# Patient Record
Sex: FEMALE | Race: WHITE
[De-identification: names, ages, dates, MRNs, and addresses within clinical notes are randomized per-mention and may not be internally consistent; named-entity substitution may affect disease eponyms.]

---

## 2018-03-30 ENCOUNTER — HOSPITAL ENCOUNTER (OUTPATIENT)
Dept: HOSPITAL 58 - RAD | Age: 33
Discharge: HOME | End: 2018-03-30
Attending: NURSE PRACTITIONER

## 2018-03-30 DIAGNOSIS — R19.7: ICD-10-CM

## 2018-03-30 DIAGNOSIS — R10.9: Primary | ICD-10-CM

## 2018-03-30 DIAGNOSIS — R11.2: ICD-10-CM

## 2018-03-30 DIAGNOSIS — R10.815: ICD-10-CM

## 2018-03-30 PROCEDURE — 85025 COMPLETE CBC W/AUTO DIFF WBC: CPT

## 2018-03-30 PROCEDURE — 80053 COMPREHEN METABOLIC PANEL: CPT

## 2018-03-30 PROCEDURE — 36415 COLL VENOUS BLD VENIPUNCTURE: CPT

## 2018-03-30 NOTE — CT
EXAM:  CT Abdomen with contrast.  CT Pelvis with contrast. 

  

HISTORY:  Mid abdominal pain and nausea. 

  

COMPARISON:  None available. 

  

TECHNIQUE:  Multiple axial images of the abdomen and pelvis were obtained following intravenous admin
istration of 75 mL of Omnipaque 350, low osmolar.  Images were reformatted in the sagittal and corona
l plane. 

  

FINDINGS:  Lung bases are clear.  No acute osseous abnormality detected. 

  

The liver, gallbladder, pancreas, spleen, adrenal glands, and kidneys without acute abnormality.  Lynette
pect nonobstructing right renal calculi in the lower pole collecting system measuring up to 0.4 cm. 

  

The appendix is dilated to 0.9 cm and does not contain internal air.  There is some submucosal fat de
position in the appendix.  No significant adjacent inflammation identified.  The bowel is otherwise u
nremarkable.  A few diverticula are present in the colon, including the right colon.  Uterus demonstr
ates normal contour.  Urinary bladder is unremarkable.  No free fluid, free air or lymphadenopathy id
entified.  Small fat-containing umbilical hernia noted. 

  

--------------------------- 

IMPRESSION: 

1.  Mildly enlarged appendix without adjacent inflammation.  Early/mild acute appendicitis could be p
resent in the proper clinical setting although in the absence of appropriate clinical symptoms, this 
finding could be due to previous / chronic inflammation.  Consider short-term follow-up if symptoms p
ersist. 

2.  Right nephrolithiasis. 

3.  Mild diverticulosis. 

  

Comment:  Findings were discussed with Mary Jane Pedroza at 3:55 p.m. on 03/30/2018.

## 2018-05-08 ENCOUNTER — OFFICE VISIT (OUTPATIENT)
Dept: FAMILY MEDICINE CLINIC | Facility: CLINIC | Age: 33
End: 2018-05-08

## 2018-05-08 ENCOUNTER — TELEPHONE (OUTPATIENT)
Dept: FAMILY MEDICINE CLINIC | Facility: CLINIC | Age: 33
End: 2018-05-08

## 2018-05-08 VITALS
SYSTOLIC BLOOD PRESSURE: 122 MMHG | HEART RATE: 73 BPM | DIASTOLIC BLOOD PRESSURE: 64 MMHG | TEMPERATURE: 98.7 F | OXYGEN SATURATION: 97 % | RESPIRATION RATE: 16 BRPM | WEIGHT: 133 LBS

## 2018-05-08 DIAGNOSIS — R10.84 GENERALIZED ABDOMINAL PAIN: Primary | ICD-10-CM

## 2018-05-08 PROCEDURE — 99213 OFFICE O/P EST LOW 20 MIN: CPT | Performed by: FAMILY MEDICINE

## 2018-05-08 NOTE — PROGRESS NOTES
Subjective   Dian Benson is a 32 y.o. female.     Chief Complaint   Patient presents with   • Abdominal Pain        History of Present Illness     she has had issues with current abdominal pain--related to eating--she has nausea--was told her appendix was enalrged    No current outpatient prescriptions on file.  No Known Allergies    No past medical history on file.  No past surgical history on file.    Review of Systems   Constitutional: Negative.    HENT: Negative.    Eyes: Negative.    Respiratory: Negative.    Cardiovascular: Negative.    Gastrointestinal: Positive for abdominal pain and nausea.   Endocrine: Negative.    Genitourinary: Negative.    Musculoskeletal: Negative.    Skin: Negative.    Allergic/Immunologic: Negative.    Neurological: Negative.    Hematological: Negative.    Psychiatric/Behavioral: Negative.        Objective  /64   Pulse 73   Temp 98.7 °F (37.1 °C)   Resp 16   Wt 60.3 kg (133 lb)   SpO2 97%   Physical Exam   Constitutional: She is oriented to person, place, and time. She appears well-developed and well-nourished.   HENT:   Head: Normocephalic and atraumatic.   Right Ear: External ear normal.   Left Ear: External ear normal.   Nose: Nose normal.   Mouth/Throat: Oropharynx is clear and moist.   Eyes: Conjunctivae and EOM are normal. Pupils are equal, round, and reactive to light.   Neck: Normal range of motion. Neck supple.   Cardiovascular: Normal rate, regular rhythm, normal heart sounds and intact distal pulses.    Pulmonary/Chest: Effort normal and breath sounds normal.   Abdominal: Soft. Bowel sounds are normal.   Musculoskeletal: Normal range of motion.   Neurological: She is alert and oriented to person, place, and time. She has normal reflexes.   Skin: Skin is warm. Capillary refill takes less than 2 seconds.   Psychiatric: She has a normal mood and affect. Her behavior is normal. Judgment and thought content normal.   Nursing note and vitals  reviewed.      Assessment/Plan   Dian was seen today for abdominal pain.    Diagnoses and all orders for this visit:    Generalized abdominal pain  -     Urinalysis - Urine, Clean Catch  -     Pregnancy, Urine - Urine, Clean Catch  -     CT Abdomen Pelvis With Contrast; Future  -     US Gallbladder; Future  -     NM HIDA scan with pharmacological intervention; Future                 Orders Placed This Encounter   Procedures   • CT Abdomen Pelvis With Contrast     Standing Status:   Future     Standing Expiration Date:   5/8/2019     Order Specific Question:   Will Oral Contrast be needed for this procedure?     Answer:   Yes     Order Specific Question:   Patient Pregnant     Answer:   No   • US Gallbladder     Standing Status:   Future     Standing Expiration Date:   5/8/2019     Order Specific Question:   Reason for Exam:     Answer:   post prandial pain   • NM HIDA scan with pharmacological intervention     Standing Status:   Future     Standing Expiration Date:   5/8/2019     Order Specific Question:   Patient Pregnant     Answer:   No   • Urinalysis - Urine, Clean Catch   • Pregnancy, Urine - Urine, Clean Catch       Follow up: 4 week(s)

## 2018-05-08 NOTE — TELEPHONE ENCOUNTER
Pt tonja she siad that last month she started having stomach pains she went to [facility] they did ct scan it showed inlarged apendix they gave her anbtx and pt felt better now since yesterday she has started having stomach pains again and she is asking to see you before 2pm today [phone]

## 2018-05-09 LAB
APPEARANCE UR: CLEAR
BILIRUB UR QL STRIP: NEGATIVE
COLOR UR: YELLOW
GLUCOSE UR QL: NEGATIVE
HCG UR QL: NEGATIVE
HGB UR QL STRIP: NEGATIVE
KETONES UR QL STRIP: NEGATIVE
LEUKOCYTE ESTERASE UR QL STRIP: NEGATIVE
NITRITE UR QL STRIP: NEGATIVE
PH UR STRIP: 8 [PH] (ref 5–8)
PROT UR QL STRIP: NEGATIVE
SP GR UR: 1.01
UROBILINOGEN UR STRIP-MCNC: NORMAL MG/DL

## 2018-05-10 ENCOUNTER — HOSPITAL ENCOUNTER (OUTPATIENT)
Dept: HOSPITAL 58 - RAD | Age: 33
Discharge: HOME | End: 2018-05-10
Attending: FAMILY MEDICINE

## 2018-05-10 DIAGNOSIS — R10.84: Primary | ICD-10-CM

## 2018-05-10 DIAGNOSIS — R10.84 GENERALIZED ABDOMINAL PAIN: ICD-10-CM

## 2018-05-10 PROCEDURE — 82565 ASSAY OF CREATININE: CPT

## 2018-05-10 PROCEDURE — 36415 COLL VENOUS BLD VENIPUNCTURE: CPT

## 2018-05-10 NOTE — US
EXAM:  ULTRASOUND ABDOMEN LIMITED 

  

HISTORY:  Abdominal pain 

  

FINDINGS:  Ultrasound abdomen, limited.  Gray-scale ultrasound and color Doppler was performed.  Live
r size was normal at about 12 cm. The liver parenchyma demonstrated normal sonographic appearance wit
hout evidence of intrahepatic biliary dilatation or focal lesion. Patent and hepatopedal main portal 
vein. 

  

No evidence of gallbladder stones or sludge.  Gallbladder wall thickness was normal at 0.16 centimete
rs and the common duct diameter normal at 0.25 centimeters. 

  

The visualized portions of the pancreas appeared unremarkable. 

  

IMPRESSION:    Findings within normal limits.

## 2018-05-10 NOTE — CT
EXAM:  CT Abdomen with contrast.  CT Pelvis with contrast. 

  

HISTORY:  Mid abdominal pain, nausea. 

  

COMPARISON:  03/30/2018. 

  

TECHNIQUE:  Multiple axial images of the abdomen and pelvis were obtained following intravenous admin
istration of 75 mL of Omnipaque 350, low osmolar.  Images were reformatted in the sagittal and corona
l plane. 

  

FINDINGS:  The lung bases are clear.  No acute osseous abnormality identified. 

  

The liver, gallbladder, pancreas, spleen, adrenal glands, and kidneys are unremarkable save for nonob
structing calculi in the right lower pole collecting system.  No hydronephrosis identified. 

  

The appendix measures up to 0.7 cm diameter.  There is no adjacent inflammation.  The appendiceal siz
e may be slightly decreased from prior examination.  Mild colonic diverticulosis noted.  There is no 
evidence for bowel obstruction or acute inflammation.  Uterus demonstrates normal contour.  Urinary b
ladder is unremarkable.  No free fluid, free air or lymphadenopathy identified. 

  

--------------------------- 

IMPRESSION: 

1.  No acute abnormality within the abdomen or pelvis. 

2.  Prominent appendix which is slightly decreased in size from prior study.  No adjacent inflammatio
n identified. 

3.  Stable right nephrolithiasis.

## 2018-05-14 ENCOUNTER — HOSPITAL ENCOUNTER (OUTPATIENT)
Dept: HOSPITAL 58 - RAD | Age: 33
Discharge: HOME | End: 2018-05-14
Attending: FAMILY MEDICINE

## 2018-05-14 DIAGNOSIS — R10.84: Primary | ICD-10-CM

## 2018-05-14 DIAGNOSIS — R10.84 GENERALIZED ABDOMINAL PAIN: ICD-10-CM

## 2018-05-14 NOTE — NM
EXAM:  Hepatobiliary scan 

  

HISTORY: Abdominal pain. 

  

COMPARISON: None of this type. Ultrasound 05/15/2018.  CT 05/10/2018. 

  

PROCEDURE: The patient was injected with 5.2 mCi of 99mTc mebrofenin intravenously.  Images of the ab
domen were obtained  at 5 min intervals for 38 minutes.  Additional images were obtained at 45 minute
s and 1 hour.  The patient was then injected with 1.3 mcg of CCK by slow infusion while images of the
 gallbladder were obtained to assess gallbladder contraction. The patient reported very mild pain dur
ing the administration of CCK. 

  

FINDINGS: Sequential images demonstrate normal uptake of tracer into the liver.  Activity is seen in 
the intrahepatic biliary ducts at about 10 minutes.  The activity appears in the gallbladder at about
 10 minutes.  Subsequent images demonstrate increasing activity in the gallbladder.  Activity first a
ppears in the small bowel at 30 minutes. 

  

The gallbladder ejection fraction is 91% . 

  

IMPRESSION: 

  

1.Normal hepatobiliary scan. 

2.The gallbladder ejection fraction is 91% (normal).

## 2018-05-14 NOTE — PROGRESS NOTES
Pt informed Hida scan was neg. She says she will get back with us if she wants the referral to gastro

## 2020-11-01 PROCEDURE — U0003 INFECTIOUS AGENT DETECTION BY NUCLEIC ACID (DNA OR RNA); SEVERE ACUTE RESPIRATORY SYNDROME CORONAVIRUS 2 (SARS-COV-2) (CORONAVIRUS DISEASE [COVID-19]), AMPLIFIED PROBE TECHNIQUE, MAKING USE OF HIGH THROUGHPUT TECHNOLOGIES AS DESCRIBED BY CMS-2020-01-R: HCPCS | Performed by: NURSE PRACTITIONER

## 2022-04-28 ENCOUNTER — OFFICE VISIT (OUTPATIENT)
Dept: FAMILY MEDICINE CLINIC | Facility: CLINIC | Age: 37
End: 2022-04-28

## 2022-04-28 ENCOUNTER — HOSPITAL ENCOUNTER (OUTPATIENT)
Dept: CT IMAGING | Facility: HOSPITAL | Age: 37
Discharge: HOME | End: 2022-04-28
Admitting: FAMILY MEDICINE

## 2022-04-28 VITALS
SYSTOLIC BLOOD PRESSURE: 120 MMHG | HEIGHT: 60 IN | HEART RATE: 101 BPM | WEIGHT: 123 LBS | OXYGEN SATURATION: 98 % | DIASTOLIC BLOOD PRESSURE: 76 MMHG | RESPIRATION RATE: 16 BRPM | BODY MASS INDEX: 24.15 KG/M2

## 2022-04-28 DIAGNOSIS — R10.33 PERIUMBILICAL ABDOMINAL PAIN: Primary | ICD-10-CM

## 2022-04-28 PROCEDURE — 74176 CT ABD & PELVIS W/O CONTRAST: CPT

## 2022-04-28 PROCEDURE — 99213 OFFICE O/P EST LOW 20 MIN: CPT | Performed by: FAMILY MEDICINE

## 2022-04-28 RX ORDER — METRONIDAZOLE 250 MG/1
250 TABLET ORAL 3 TIMES DAILY
Qty: 21 TABLET | Refills: 0 | Status: SHIPPED | OUTPATIENT
Start: 2022-04-28 | End: 2022-05-05

## 2022-04-28 RX ORDER — OMEPRAZOLE 20 MG/1
20 CAPSULE, DELAYED RELEASE ORAL DAILY
Qty: 30 CAPSULE | Refills: 0 | Status: SHIPPED | OUTPATIENT
Start: 2022-04-28 | End: 2022-05-26

## 2022-04-28 RX ORDER — DICYCLOMINE HYDROCHLORIDE 10 MG/1
CAPSULE ORAL
Qty: 30 CAPSULE | Refills: 0 | Status: SHIPPED | OUTPATIENT
Start: 2022-04-28 | End: 2022-05-24 | Stop reason: SDUPTHER

## 2022-04-28 NOTE — PROGRESS NOTES
"Subjective   Dian Benson is a 36 y.o. female.     Chief Complaint   Patient presents with   • Abdominal Pain     Pt c/o abd pain just above umbilicus.  She says that she will have pain on the right side abd and if she presses on the right abd it causes the pain above the umbilicus to intensify.          History of Present Illness     as avove--denies fever, chills or anorexia    No current outpatient medications on file.  No Known Allergies    BMI is within normal parameters. No follow-up required.      No past medical history on file.  No past surgical history on file.    Review of Systems   Constitutional: Negative.    HENT: Negative.    Eyes: Negative.    Respiratory: Negative.    Cardiovascular: Negative.    Gastrointestinal: Positive for abdominal pain.   Endocrine: Negative.    Genitourinary: Negative.    Musculoskeletal: Negative.    Skin: Negative.    Allergic/Immunologic: Negative.    Neurological: Negative.    Hematological: Negative.    Psychiatric/Behavioral: Negative.        Objective  /76   Pulse 101   Resp 16   Ht 152.4 cm (60\")   Wt 55.8 kg (123 lb)   SpO2 98%   BMI 24.02 kg/m²   Physical Exam  Vitals and nursing note reviewed.   Constitutional:       Appearance: Normal appearance. She is normal weight.   HENT:      Head: Normocephalic and atraumatic.      Nose: Nose normal.      Mouth/Throat:      Mouth: Mucous membranes are moist.   Eyes:      Pupils: Pupils are equal, round, and reactive to light.   Cardiovascular:      Rate and Rhythm: Normal rate.      Pulses: Normal pulses.      Heart sounds: Normal heart sounds.   Pulmonary:      Effort: Pulmonary effort is normal.   Abdominal:      General: Abdomen is flat. Bowel sounds are normal.      Palpations: There is no mass.      Tenderness: There is abdominal tenderness. There is no guarding or rebound.      Hernia: No hernia is present.   Musculoskeletal:         General: Normal range of motion.      Cervical back: Normal " range of motion and neck supple.   Skin:     General: Skin is warm.      Capillary Refill: Capillary refill takes less than 2 seconds.   Neurological:      General: No focal deficit present.      Mental Status: She is alert. Mental status is at baseline.   Psychiatric:         Mood and Affect: Mood normal.         Assessment/Plan   Diagnoses and all orders for this visit:    1. Periumbilical abdominal pain (Primary)  -     CT Abdomen Pelvis Without Contrast      Call for the report           Orders Placed This Encounter   Procedures   • CT Abdomen Pelvis Without Contrast     Order Specific Question:   Will Oral Contrast be needed for this procedure?     Answer:   No     Order Specific Question:   Patient Pregnant     Answer:   No     Order Specific Question:   Release to patient     Answer:   Immediate       Follow up: 4 week(s)

## 2022-05-24 RX ORDER — DICYCLOMINE HYDROCHLORIDE 10 MG/1
CAPSULE ORAL
Qty: 30 CAPSULE | Refills: 0 | Status: SHIPPED | OUTPATIENT
Start: 2022-05-24 | End: 2022-05-26

## 2022-05-24 RX ORDER — METRONIDAZOLE 250 MG/1
250 TABLET ORAL 3 TIMES DAILY
Qty: 21 TABLET | Refills: 0 | Status: SHIPPED | OUTPATIENT
Start: 2022-05-24 | End: 2022-05-25

## 2022-05-25 ENCOUNTER — TELEPHONE (OUTPATIENT)
Dept: FAMILY MEDICINE CLINIC | Facility: CLINIC | Age: 37
End: 2022-05-25

## 2022-05-25 RX ORDER — METRONIDAZOLE 250 MG/1
TABLET ORAL
Qty: 21 TABLET | Refills: 0 | Status: SHIPPED | OUTPATIENT
Start: 2022-05-25 | End: 2022-06-08

## 2022-05-25 NOTE — TELEPHONE ENCOUNTER
Refill not appropriate     Rx Refill Note  Requested Prescriptions     Pending Prescriptions Disp Refills   • metroNIDAZOLE (FLAGYL) 250 MG tablet [Pharmacy Med Name: METRONIDAZOLE 250MG TABLETS] 21 tablet 0     Sig: TAKE 1 TABLET BY MOUTH THREE TIMES DAILY FOR 7 DAYS      Last office visit with prescribing clinician: 4/28/2022      Next office visit with prescribing clinician: 5/26/2022            Chari Forde MA  05/25/22, 11:05 CDT

## 2022-05-25 NOTE — TELEPHONE ENCOUNTER
Pt called concerned that [facility] had told her to stop the flagyl. Per Dr Sauer she is to continue taking Flagyl.  Pt has appt in AM for f/u.

## 2022-05-26 ENCOUNTER — OFFICE VISIT (OUTPATIENT)
Dept: FAMILY MEDICINE CLINIC | Facility: CLINIC | Age: 37
End: 2022-05-26

## 2022-05-26 VITALS
DIASTOLIC BLOOD PRESSURE: 78 MMHG | WEIGHT: 122 LBS | HEIGHT: 60 IN | OXYGEN SATURATION: 98 % | SYSTOLIC BLOOD PRESSURE: 118 MMHG | BODY MASS INDEX: 23.95 KG/M2 | RESPIRATION RATE: 14 BRPM | HEART RATE: 73 BPM

## 2022-05-26 DIAGNOSIS — K57.92 DIVERTICULITIS: Primary | ICD-10-CM

## 2022-05-26 PROCEDURE — 99213 OFFICE O/P EST LOW 20 MIN: CPT | Performed by: FAMILY MEDICINE

## 2022-05-26 RX ORDER — ONDANSETRON 4 MG/1
1 TABLET, ORALLY DISINTEGRATING ORAL EVERY 8 HOURS PRN
COMMUNITY
Start: 2022-05-24

## 2022-05-26 RX ORDER — AMOXICILLIN AND CLAVULANATE POTASSIUM 875; 125 MG/1; MG/1
1 TABLET, FILM COATED ORAL
COMMUNITY
Start: 2022-05-24 | End: 2022-05-31

## 2022-05-26 NOTE — PROGRESS NOTES
"Subjective   Dian Benson is a 36 y.o. female.     Chief Complaint   Patient presents with   • Abdominal Pain     4 wk f/u  also ER f/u  worsening abd pain        History of Present Illness     she recdntly went to the ed at [facility] with abd pain---was found to hae diverticulitis on ct scan--she denies any worsening abs pain, fever or diarrhea      Current Outpatient Medications:   •  amoxicillin-clavulanate (AUGMENTIN) 875-125 MG per tablet, Take 1 tablet by mouth., Disp: , Rfl:   •  ondansetron ODT (ZOFRAN-ODT) 4 MG disintegrating tablet, Take 1 tablet by mouth Every 8 (Eight) Hours As Needed., Disp: , Rfl:   •  metroNIDAZOLE (FLAGYL) 250 MG tablet, TAKE 1 TABLET BY MOUTH THREE TIMES DAILY FOR 7 DAYS, Disp: 21 tablet, Rfl: 0  No Known Allergies    BMI is within normal parameters. No other follow-up for BMI required.      No past medical history on file.  No past surgical history on file.    Review of Systems   Constitutional: Negative.    HENT: Negative.    Eyes: Negative.    Respiratory: Negative.    Cardiovascular: Negative.    Gastrointestinal: Positive for abdominal pain.   Endocrine: Negative.    Genitourinary: Negative.    Musculoskeletal: Negative.    Skin: Negative.        Objective  /78   Pulse 73   Resp 14   Ht 152.4 cm (60\")   Wt 55.3 kg (122 lb)   SpO2 98%   BMI 23.83 kg/m²   Physical Exam  Vitals and nursing note reviewed.   Constitutional:       Appearance: Normal appearance. She is normal weight.   HENT:      Head: Normocephalic and atraumatic.      Nose: Nose normal.      Mouth/Throat:      Mouth: Mucous membranes are moist.   Eyes:      Extraocular Movements: Extraocular movements intact.      Conjunctiva/sclera: Conjunctivae normal.      Pupils: Pupils are equal, round, and reactive to light.   Cardiovascular:      Rate and Rhythm: Normal rate and regular rhythm.      Pulses: Normal pulses.      Heart sounds: Normal heart sounds.   Pulmonary:      Effort: Pulmonary effort " is normal.   Abdominal:      General: Abdomen is flat. Bowel sounds are normal.      Palpations: Abdomen is soft.   Musculoskeletal:         General: Normal range of motion.      Cervical back: Normal range of motion and neck supple.   Skin:     General: Skin is warm.      Capillary Refill: Capillary refill takes less than 2 seconds.   Neurological:      General: No focal deficit present.      Mental Status: She is alert. Mental status is at baseline.   Psychiatric:         Mood and Affect: Mood normal.         Behavior: Behavior normal.         Thought Content: Thought content normal.         Judgment: Judgment normal.         Assessment & Plan   Diagnoses and all orders for this visit:    1. Diverticulitis (Primary)  -     Ambulatory Referral to Gastroenterology        Finish antbx           Orders Placed This Encounter   Procedures   • Ambulatory Referral to Gastroenterology     Referral Priority:   Routine     Referral Type:   Consultation     Referral Reason:   Specialty Services Required     Requested Specialty:   Gastroenterology     Number of Visits Requested:   1       Follow up: 6 month(s)

## 2022-06-08 ENCOUNTER — TELEPHONE (OUTPATIENT)
Dept: FAMILY MEDICINE CLINIC | Facility: CLINIC | Age: 37
End: 2022-06-08

## 2022-06-08 RX ORDER — CIPROFLOXACIN 500 MG/1
500 TABLET, FILM COATED ORAL 2 TIMES DAILY
Qty: 14 TABLET | Refills: 0 | Status: SHIPPED | OUTPATIENT
Start: 2022-06-08 | End: 2022-06-15

## 2022-06-08 RX ORDER — METRONIDAZOLE 500 MG/1
500 TABLET ORAL 3 TIMES DAILY
Qty: 21 TABLET | Refills: 0 | Status: SHIPPED | OUTPATIENT
Start: 2022-06-08 | End: 2022-06-15

## 2022-06-08 NOTE — TELEPHONE ENCOUNTER
The main reason for this message is Yesica wanted me to tell you that she is starting to have pain/discomfort in her left side again like what she had before with the diverticulitis. She wanted to update you that it has come back since the medication ran out. She didn’t know if you wanted to send her another prescription to last her until her next appointment with you or if you needed to see her sooner. She will be at work tomorrow but will have her phone on her. So please call her on her cell [phone]. I will be sleeping so I can’t check MyChart. Thank you so much for your help with everything.     Best,     Josiah         cipro 500mg bid x 7 days plus flagyl 500mg tid x 7dyas---appt with me end of this week--er if worsens

## 2022-06-08 NOTE — TELEPHONE ENCOUNTER
Spoke to patient and she stated that she is feeling better today and to send medcine in case she starts feeling bad again. She stated she will call and schedule a follow up if she has to start taking medication

## 2022-06-15 ENCOUNTER — TELEPHONE (OUTPATIENT)
Dept: FAMILY MEDICINE CLINIC | Facility: CLINIC | Age: 37
End: 2022-06-15

## 2022-06-15 DIAGNOSIS — R50.9 FEVER, UNSPECIFIED FEVER CAUSE: ICD-10-CM

## 2022-06-15 DIAGNOSIS — R05.9 COUGH: Primary | ICD-10-CM

## 2022-06-15 DIAGNOSIS — R05.9 COUGH: ICD-10-CM

## 2022-07-06 ENCOUNTER — OFFICE VISIT (OUTPATIENT)
Dept: GASTROENTEROLOGY | Facility: CLINIC | Age: 37
End: 2022-07-06

## 2022-07-06 VITALS
WEIGHT: 120 LBS | OXYGEN SATURATION: 100 % | BODY MASS INDEX: 23.56 KG/M2 | TEMPERATURE: 97.1 F | HEIGHT: 60 IN | SYSTOLIC BLOOD PRESSURE: 122 MMHG | DIASTOLIC BLOOD PRESSURE: 80 MMHG | HEART RATE: 75 BPM

## 2022-07-06 DIAGNOSIS — Z87.19 HISTORY OF DIVERTICULITIS: Primary | ICD-10-CM

## 2022-07-06 PROCEDURE — 99214 OFFICE O/P EST MOD 30 MIN: CPT | Performed by: NURSE PRACTITIONER

## 2022-07-06 NOTE — PROGRESS NOTES
[facility] GASTROENTEROLOGY - OFFICE NOTE    7/6/2022    Dian Benson   1985    Primary Physician: Grover Sauer MD    Chief Complaint   Patient presents with   • Diverticulitis         HISTORY OF PRESENT ILLNESS:     Dian Benson is a 36 y.o. female presents with recent diverticulitis.  She was treated with antibiotics April 2022 and May 2022 for diverticulitis , see CT report below.  She finished her last antibiotic 1 June.  She is currently asymptomatic.  No rectal bleeding.  No fevers or chills.  No abdominal pain.        She has had 2 CAT scans of the abdomen and pelvis.  The CT from April 2022 noted focal thickening of the wall of the colon in the region of the hepatic flexure and stranding of the adjacent fat.  There were scattered diverticuli noted in that area as well.  The CT done May 2022 noted uncomplicated acute diverticulitis along the level of the splenic flexure.      CT ABDOMEN PELVIS W IV CONTRAST Additional Contrast? None (05/24/2022 19:44 EDT)    CT Abdomen Pelvis Without Contrast (04/28/2022 15:13)      She has never had a colonoscopy.  No family history of colon cancer or colon polyps.        Past Medical History:   Diagnosis Date   • Diverticulitis        History reviewed. No pertinent surgical history.    No outpatient medications have been marked as taking for the 7/6/22 encounter (Office Visit) with Lyndsay Hand APRN.       No Known Allergies    Social History     Socioeconomic History   • Marital status:    Tobacco Use   • Smoking status: Never Smoker   • Smokeless tobacco: Never Used   Substance and Sexual Activity   • Alcohol use: Yes     Comment: occ   • Drug use: No   • Sexual activity: Defer       Family History   Problem Relation Age of Onset   • Colon cancer Neg Hx    • Colon polyps Neg Hx        Review of Systems   Constitutional: Negative for chills, fever and unexpected weight change.   Respiratory: Negative for cough, shortness  "of breath and wheezing.    Cardiovascular: Negative for chest pain and palpitations.   Gastrointestinal: Negative for abdominal distention, abdominal pain, anal bleeding, blood in stool, constipation, diarrhea, nausea and vomiting.        Vitals:    07/06/22 1320   BP: 122/80   Pulse: 75   Temp: 97.1 °F (36.2 °C)   SpO2: 100%   Weight: 54.4 kg (120 lb)   Height: 152.4 cm (60\")      Body mass index is 23.44 kg/m².    Physical Exam  Vitals reviewed.   Constitutional:       General: She is not in acute distress.  Cardiovascular:      Rate and Rhythm: Normal rate and regular rhythm.      Heart sounds: Normal heart sounds.   Pulmonary:      Effort: Pulmonary effort is normal.      Breath sounds: Normal breath sounds.   Abdominal:      General: Bowel sounds are normal. There is no distension.      Palpations: Abdomen is soft.      Tenderness: There is no abdominal tenderness.   Skin:     General: Skin is warm and dry.   Neurological:      Mental Status: She is alert.         Results for orders placed or performed during the hospital encounter of 11/01/20   COVID-19,[facility] ROUTINE, NP/OP SWAB IN TRANSPORT MEDIA OR ESWAB 72 HR TAT - Swab, Oropharynx    Specimen: Oropharynx; Swab   Result Value Ref Range    SARS-CoV-2, [facility] Not Detected Not Detected   COVID [facility] Priority - Swab, Oropharynx    Specimen: Oropharynx; Swab   Result Value Ref Range    COVID [facility] PRIORITY Comment            ASSESSMENT AND PLAN    Assessment & Plan     Diagnoses and all orders for this visit:    1. History of diverticulitis (Primary)  Comments:  recent   Orders:  -     Case Request; Standing  -     Case Request    Other orders  -     Follow Anesthesia Guidelines / Protocol; Future  -     Obtain Informed Consent; Future    We discussed diverticulosis and diverticulitis in detail.  She is to contact our office if she has recurrence of symptoms of diverticulitis.  We will plan for colonoscopy 6 weeks after she finished antibiotics.  We did " discuss that I strongly recommend a colonoscopy also just to make sure there is nothing worrisome such as a colon cancer.              COLONOSCOPY WITH ANESTHESIA (N/A)  All risks, benefits, alternatives, and indications of colonoscopy procedure have been discussed with the patient. Risks to include perforation of the colon requiring possible surgery or colostomy, risk of bleeding from biopsies or removal of colon tissue, possibility of missing a colon polyp or cancer, or adverse drug reaction.  Benefits to include the diagnosis and management of disease of the colon and rectum. Alternatives to include barium enema, radiographic evaluation, lab testing or no intervention. Pt verbalizes understanding and agrees.                      Lyndsay Hand, JESENIA

## 2022-08-08 RX ORDER — SODIUM PICOSULFATE, MAGNESIUM OXIDE, AND ANHYDROUS CITRIC ACID 10; 3.5; 12 MG/160ML; G/160ML; G/160ML
160 LIQUID ORAL ONCE
Qty: 320 ML | Refills: 0 | Status: SHIPPED | OUTPATIENT
Start: 2022-08-08 | End: 2022-08-08

## 2022-08-09 ENCOUNTER — ANESTHESIA EVENT (OUTPATIENT)
Dept: GASTROENTEROLOGY | Facility: HOSPITAL | Age: 37
End: 2022-08-09

## 2022-08-09 ENCOUNTER — TELEPHONE (OUTPATIENT)
Dept: GASTROENTEROLOGY | Facility: CLINIC | Age: 37
End: 2022-08-09

## 2022-08-09 ENCOUNTER — ANESTHESIA (OUTPATIENT)
Dept: GASTROENTEROLOGY | Facility: HOSPITAL | Age: 37
End: 2022-08-09

## 2022-08-09 ENCOUNTER — HOSPITAL ENCOUNTER (OUTPATIENT)
Facility: HOSPITAL | Age: 37
Setting detail: HOSPITAL OUTPATIENT SURGERY
Discharge: HOME | End: 2022-08-09
Attending: INTERNAL MEDICINE | Admitting: INTERNAL MEDICINE

## 2022-08-09 VITALS
SYSTOLIC BLOOD PRESSURE: 110 MMHG | HEART RATE: 72 BPM | RESPIRATION RATE: 16 BRPM | TEMPERATURE: 97.5 F | DIASTOLIC BLOOD PRESSURE: 60 MMHG | BODY MASS INDEX: 22.58 KG/M2 | OXYGEN SATURATION: 100 % | HEIGHT: 60 IN | WEIGHT: 115 LBS

## 2022-08-09 LAB — B-HCG UR QL: NEGATIVE

## 2022-08-09 PROCEDURE — 45378 DIAGNOSTIC COLONOSCOPY: CPT | Performed by: INTERNAL MEDICINE

## 2022-08-09 PROCEDURE — 25010000002 PROPOFOL 10 MG/ML EMULSION: Performed by: NURSE ANESTHETIST, CERTIFIED REGISTERED

## 2022-08-09 PROCEDURE — 81025 URINE PREGNANCY TEST: CPT | Performed by: NURSE ANESTHETIST, CERTIFIED REGISTERED

## 2022-08-09 RX ORDER — LIDOCAINE HYDROCHLORIDE 20 MG/ML
INJECTION, SOLUTION EPIDURAL; INFILTRATION; INTRACAUDAL; PERINEURAL AS NEEDED
Status: DISCONTINUED | OUTPATIENT
Start: 2022-08-09 | End: 2022-08-09 | Stop reason: SURG

## 2022-08-09 RX ORDER — ONDANSETRON 2 MG/ML
4 INJECTION INTRAMUSCULAR; INTRAVENOUS ONCE AS NEEDED
Status: DISCONTINUED | OUTPATIENT
Start: 2022-08-09 | End: 2022-08-09 | Stop reason: HOSPADM

## 2022-08-09 RX ORDER — PROPOFOL 10 MG/ML
VIAL (ML) INTRAVENOUS AS NEEDED
Status: DISCONTINUED | OUTPATIENT
Start: 2022-08-09 | End: 2022-08-09 | Stop reason: SURG

## 2022-08-09 RX ORDER — SODIUM CHLORIDE 9 MG/ML
500 INJECTION, SOLUTION INTRAVENOUS CONTINUOUS PRN
Status: DISCONTINUED | OUTPATIENT
Start: 2022-08-09 | End: 2022-08-09 | Stop reason: HOSPADM

## 2022-08-09 RX ORDER — SODIUM CHLORIDE 0.9 % (FLUSH) 0.9 %
10 SYRINGE (ML) INJECTION AS NEEDED
Status: DISCONTINUED | OUTPATIENT
Start: 2022-08-09 | End: 2022-08-09 | Stop reason: HOSPADM

## 2022-08-09 RX ORDER — LIDOCAINE HYDROCHLORIDE 10 MG/ML
0.5 INJECTION, SOLUTION EPIDURAL; INFILTRATION; INTRACAUDAL; PERINEURAL ONCE AS NEEDED
Status: CANCELLED | OUTPATIENT
Start: 2022-08-09

## 2022-08-09 RX ADMIN — PROPOFOL 300 MG: 10 INJECTION, EMULSION INTRAVENOUS at 07:55

## 2022-08-09 RX ADMIN — SODIUM CHLORIDE 500 ML: 9 INJECTION, SOLUTION INTRAVENOUS at 07:24

## 2022-08-09 RX ADMIN — LIDOCAINE HYDROCHLORIDE 20 MG: 20 INJECTION, SOLUTION EPIDURAL; INFILTRATION; INTRACAUDAL; PERINEURAL at 07:55

## 2022-08-09 NOTE — H&P
"[facility] Gastroenterology  Pre Procedure History & Physical    Chief Complaint:   Abnormal CT scan    Subjective     HPI:   Back in the spring she had an episode of colitis which was subsequently diagnosed with diverticulitis.  She had a CT scan showed inflammatory changes around hepatic flexure area.  She is asymptomatic now.  She presents for colonoscopy investigation.    Past Medical History:   Past Medical History:   Diagnosis Date   • Colitis    • Diverticulitis        Past Surgical History:  Past Surgical History:   Procedure Laterality Date   • COLONOSCOPY          Family History:  Family History   Problem Relation Age of Onset   • Colon cancer Neg Hx    • Colon polyps Neg Hx        Social History:   reports that she has never smoked. She has never used smokeless tobacco. She reports current alcohol use. She reports that she does not use drugs.    Medications:   Prior to Admission medications    Medication Sig Start Date End Date Taking? Authorizing Provider   ondansetron ODT (ZOFRAN-ODT) 4 MG disintegrating tablet Take 1 tablet by mouth Every 8 (Eight) Hours As Needed. 5/24/22   Provider, MD Joi Ryan Picosulfate-Mag Ox-Cit Acd (Clenpiq) 10-3.5-12 MG-GM -GM/160ML solution Take 160 mL by mouth 1 (One) Time for 1 dose. Take as directed 8/8/22 8/8/22  Lyndsay Hand, JESENIA       Allergies:  Patient has no known allergies.    ROS:    General: Weight stable  Resp: No SOA  Cardiovascular: No CP    Objective     Blood pressure 124/85, pulse 71, temperature 97.5 °F (36.4 °C), temperature source Temporal, resp. rate 18, height 152.4 cm (60\"), weight 52.2 kg (115 lb), last menstrual period 08/09/2022, SpO2 100 %.    Physical Exam   Constitutional: Pt is oriented to person, place, and in no distress.   Cardiovascular: Normal rate, regular rhythm.    Pulmonary/Chest: Effort normal. No respiratory distress.    Abdominal: Non-distended.  Psychiatric: Mood, memory, affect and judgment appear normal. "     Assessment & Plan     Diagnosis:  History of diverticulitis, abnormal CT scan    Anticipated Surgical Procedure:  Colonoscopy    The risks, benefits, and alternatives of this procedure have been discussed with the patient or the responsible party- the patient understands and agrees to proceed.    EMR Dragon/transcription disclaimer:  Much of this encounter note is electronic transcription/translation of spoken language to printed text.  The electronic translation of spoken language may be erroneous, or at times, nonsensical words or phrases may be inadvertently transcribed.  Although I have reviewed the note for such errors, some may still exist.

## 2022-08-09 NOTE — ANESTHESIA POSTPROCEDURE EVALUATION
"Patient: Dian KHAN    Procedure Summary     Date: 08/09/22 Room / Location: [facility] ENDOSCOPY 5 / [facility] ENDOS[facility]    Anesthesia Start: 0752 Anesthesia Stop: 0817    Procedure: COLONOSCOPY WITH ANESTHESIA (N/A ) Diagnosis:       History of diverticulitis      (History of diverticulitis [Z87.19])    Surgeons: Alcides Dunham MD Provider: Mar Corbin CRNA    Anesthesia Type: MAC ASA Status: 1          Anesthesia Type: MAC    Vitals  Vitals Value Taken Time   /62 08/09/22 0826   Temp     Pulse 78 08/09/22 0826   Resp 15 08/09/22 0820   SpO2 100 % 08/09/22 0826   Vitals shown include unvalidated device data.        Post Anesthesia Care and Evaluation    Patient location during evaluation: PACU  Patient participation: complete - patient participated  Level of consciousness: awake and alert  Pain management: adequate    Airway patency: patent  Anesthetic complications: No anesthetic complications    Cardiovascular status: acceptable  Respiratory status: acceptable  Hydration status: acceptable    Comments: Blood pressure 95/56, pulse 71, temperature 97.5 °F (36.4 °C), temperature source Temporal, resp. rate 15, height 152.4 cm (60\"), weight 52.2 kg (115 lb), last menstrual period 08/09/2022, SpO2 100 %.    Pt discharged from PACU based on pj score >8      "

## 2022-08-09 NOTE — ANESTHESIA PREPROCEDURE EVALUATION
Anesthesia Evaluation     Patient summary reviewed   no history of anesthetic complications:  NPO Solid Status: > 8 hours             Airway   Mallampati: I  Dental      Pulmonary - negative pulmonary ROS   Cardiovascular - negative cardio ROS  Exercise tolerance: excellent (>7 METS)        Neuro/Psych- negative ROS  GI/Hepatic/Renal/Endo - negative ROS     Musculoskeletal     Abdominal    Substance History      OB/GYN          Other                        Anesthesia Plan    ASA 1     MAC       Anesthetic plan, risks, benefits, and alternatives have been provided, discussed and informed consent has been obtained with: patient.        CODE STATUS:

## 2022-11-10 RX ORDER — AZITHROMYCIN 250 MG/1
TABLET, FILM COATED ORAL
Qty: 6 TABLET | Refills: 0 | Status: SHIPPED | OUTPATIENT
Start: 2022-11-10

## (undated) DEVICE — TBG SMPL FLTR LINE NASL 02/C02 A/ BX/100

## (undated) DEVICE — Device: Brand: DEFENDO AIR/WATER/SUCTION AND BIOPSY VALVE

## (undated) DEVICE — SENSR O2 OXIMAX FNGR A/ 18IN NONSTR

## (undated) DEVICE — THE CHANNEL CLEANING BRUSH IS A NYLON FLEXI BRUSH ATTACHED TO A FLEXIBLE PLASTIC SHEATH DESIGNED TO SAFELY REMOVE DEBRIS FROM FLEXIBLE ENDOSCOPES.

## (undated) DEVICE — CUFF,BP,DISP,1 TUBE,ADULT,HP: Brand: MEDLINE

## (undated) DEVICE — YANKAUER,BULB TIP WITH VENT: Brand: ARGYLE

## (undated) DEVICE — MASK,OXYGEN,MED CONC,ADLT,7' TUB, UC: Brand: PENDING